# Patient Record
(demographics unavailable — no encounter records)

---

## 2024-09-22 NOTE — HMCIMG
US OB <14 WEEKS



HISTORY: Abdominal pain



COMPARISON: None



TECHNIQUE: Obstetrical ultrasound study was performed.



FINDINGS: The uterus measures 15.8 x 7 x 10.6 centimeter. Right ovary

measures 2.1 x 3.1 x 3.6 centimeter. Left ovary measures 3.5 x 2.4 x 1.9

centimeter. Flow is seen in both ovaries. There is single intrauterine

gestation with estimated gestational age of  8 weeks and 6 days. Fetal

heart rate is 181 beats per minute. No fluid is seen in the cul-de-sac.

There is right ovarian cyst measuring 1.7 x 1.4 x 1.6 cm.



IMPRESSION: 

1. There is single intrauterine gestation with estimated gestational age

of  8 weeks and 6 days. Fetal heart rate is 181 beats per minute.

## 2024-09-23 NOTE — ERN
ED Note


History of Present Illness


Stated Complaint:  C/O ABD PAIN WITH N X V X DIARRHEA


Chief Complaint:  Abdominal Pain


Time Seen by MD:  20:49


Time Seen by Midlevel:  20:49


Dictation:


The Patient is a 22-year-old female with a history of  who presents to 

the emergency department with complaints of nausea, nonbloody vomiting, 

nonbloody diarrhea, epigastric pain onset today around 3:00 a.m..  Patient 

reports she is eight weeks pregnant.  A2.  Patient denies any vaginal 

bleeding or discharge, lower abdominal pain.  Patient denies any fevers.


Allergies:  


Coded Allergies:  


     No Known Allergies (Unverified  Allergy, Unknown, 23)


Home Meds


Active Scripts


Prenatal 93/Iron/Folate 9/Dha (Westgel Dha Softgel) 1 Each Capsule, 1 EACH PO 

DAILY, #30 CAP 0 Refills


   Prov:MARQUIS RAUSCH MD         23


Cephalexin Monohydrate (Keflex) 500 Mg Cap, 500 MG PO QID for 7 Days, #28 CAP 0 

Refills


   Prov:MARQUIS RAUSCH MD         23





Past Medical History


Past Medical History:  No Pertinent History


Surgical History:  


Social History:  Negative, Lives with family


LMP:  2024


:  4


Para:  1


Aborts:  2





Review of System


Dictation


Constitutional: Negative for fever,chills, and weight loss


Eyes: Negative for injury, pain,redness, and discharge


ENT: Negative for injury,pain or swelling


Cardiovascular: Negative for chest pain, palpitations, and edema


Respiratory: Negative for shortness of breath, cough, and wheezing, 


Abdomen/GI: Negative for constipation.  Abdominal pain, nausea, vomiting, 

diarrhea


Back: Negative for injury and pain


: Negative for injury, bleeding and discharge


MS/Extremity: Negative for injury and deformity


Skin: Negative for rash, and discoloration


Neuro: Negative for headache, weakness, numbness, tingling, and seizure 


Psych: Negative for suicide ideation, homicidal ideation, and hallucinations





Initial Vital Sign


VS





                                   Vital Signs








  Date Time  Temp Pulse Resp B/P (MAP) Pulse Ox O2 Delivery O2 Flow Rate FiO2


 


24 20:49 99.3 109 20 127/71 98 Room Air  


 


24 21:17       0 21











Physical Exam


Dictation


Vital Signs reviewed 


General Appearance: Alert, oriented x 3, no acute distress, well developed, 

nourished. 


Head and Face: non-traumatic.


Eyes: PERRL, pink conjunctivas, eyelid no trauma, anterior chamber with arcus 

senilis. 


Ears: Pinnas intact and no signs of trauma or erythema ear canals clear and no 

discharge TM no erythema 


Nose: No discharge, no bleeding. 


Oropharynx: Mouth normal, tongue pink.


pharynx clear,no erythema, tonsils no exudates, no abscesses noted,  mucous 

membrane moist 


Neck: Supple, non-tender, no thyromegaly, no masses, no JVD, no bruits 


Breast:Deferred 


Chest:No tenderness, no crepitus, no paradoxical movement, no retractions 


Lungs:Clear, well-ventilated, symmetric, no rales, no wheezing, no rhonchi, no 

stridor, good breath sounds bilaterally 


Heart: Regular rate, regular rhythm, no murmur, no gallops 


Vascular: no peripheral edema, 


Abdomen: Soft, positive bowel sounds, nondistended, no guarding, 


nontender, no rebound, no masses no hepatomegaly, no splenomegaly, no Charlton's 

sign, no hernias.


Rectal: Deferred


Genital: Deferred


Neurological: Normal speech,  motor function intact, sensory function intact 


Musculoskeletal: Neck nontender, full range of motion, back nontender, full 

range of motion,


Extremities: nontender, full range of motion 


Skin: Color pink, dry, no turgor, no rash, no lacerations, no abrasions, no 

contusions.


Lymphatic: Deferred





Results (Laboratory/Radiology)


Laboratory/Radiology





Laboratory Tests








Test


 24


21:03 24


21:14 24


22:03


 


White Blood Count


 9.7 K/uL


(4.8-10.8) 


 





 


Red Blood Count


 5.00 MIL/uL


(4.00-5.50) 


 





 


Hemoglobin


 15.5 g/dL


(12.0-16.0) 


 





 


Hematocrit


 44.8 % (36-48)


 


 





 


Mean Corpuscular Volume


 89.6 fL


(79-99) 


 





 


Mean Corpuscular Hemoglobin


 31.0 pg


(27.0-33.0) 


 





 


Mean Corpuscular Hemoglobin


Concent 34.6 g/dL


(32.0-36.0) 


 





 


Red Cell Distribution Width


 12.0 %


(11.0-15.5) 


 





 


Platelet Count


 254 K/uL


(130-400) 


 





 


Mean Platelet Volume


 10.4 fL


(7.5-10.5) 


 





 


Immature Granulocyte % (Auto) 0.3 % (0-1)    


 


Neutrophils (%) (Auto)


 56.3 %


(40.0-77.0) 


 





 


Lymphocytes (%) (Auto)


 32.0 %


(21.0-51.0) 


 





 


Monocytes (%) (Auto)


 7.3 %


(3.0-13.0) 


 





 


Eosinophils (%) (Auto)


 3.5 %


(0.0-8.0) 


 





 


Basophils (%) (Auto)


 0.6 %


(0.0-5.0) 


 





 


Neutrophils # (Auto)


 5.4 K/uL


(1.8-7.7) 


 





 


Lymphocytes # (Auto)


 3.1 K/uL


(1.0-4.8) 


 





 


Monocytes # (Auto)


 0.7 K/uL


(0.1-1.0) 


 





 


Eosinophils # (Auto)


 0.34 K/uL


(0.00-0.70) 


 





 


Basophils # (Auto)


 0.06 K/uL


(0.00-0.20) 


 





 


Absolute Immature Granulocyte


(auto 0.03 K/uL


(0-1) 


 





 


Nucleated Red Blood Cells


 0.0 %


(0.0-0.19) 


 





 


Urine Color


 


 YELLOW


(YELLOW) 





 


Urine Appearance


 


 CLOUDY (CLEAR)


H 





 


Urine pH  7.0 (5.0-8.0)   


 


Urine Specific Gravity


 


 1.021


(1.001-1.031) 





 


Urine Protein


 


 20 mg/dL


(NEGATIVE)  H 





 


Urine Glucose (UA)


 


 NEGATIVE mg/dL


(NEGATIVE) 





 


Urine Ketones


 


 40 mg/dL


(NEGATIVE)  H 





 


Urine Occult Blood


 


 NEGATIVE


(NEGATIVE) 





 


Urine Nitrate


 


 NEGATIVE


(NEGATIVE) 





 


Urine Bilirubin


 


 NEGATIVE mg/dL


(NEGATIVE) 





 


Urine Urobilinogen


 


 0.2 mg/dL


(0.2-1.0) 





 


Urine Leukocyte Esterase


 


 250 Crystal/uL


(NEGATIVE)  H 





 


Urine RBC


 


 2-5 /HPF (0-1)


H 





 


Urine WBC


 


 11-25 /HPF


(0-1)  H 





 


Urine Squamous Epithelial


Cells 


 FEW /HPF (0-2)


 





 


Urine Bacteria


 


 RARE /HPF


(None Seen) 





 


Sodium Level


 


 


 130 mmol/L


(136-145)  L


 


Potassium Level


 


 


 3.1 mmol/L


(3.5-5.1)  L


 


Chloride Level


 


 


 98 mmol/L


(101-111)  L


 


Carbon Dioxide Level


 


 


 25 mmol/L


(21-32)


 


Blood Urea Nitrogen


 


 


 5 mg/dL (7-18)


L


 


Creatinine


 


 


 0.6 mg/dL


(0.5-1.0)


 


Glomerular Filtration Rate


Calc 


 


 130 mL/min


(>90)


 


Random Glucose


 


 


 130 mg/dL


()  H


 


Total Calcium


 


 


 9.0 mg/dL


(8.5-10.1)


 


Total Bilirubin


 


 


 0.4 mg/dL


(0.2-1.0)


 


Aspartate Amino Transf


(AST/SGOT) 


 


 12 U/L (10-37)





 


Alanine Aminotransferase


(ALT/SGPT) 


 


 23 U/L (12-78)





 


Alkaline Phosphatase


 


 


 56 U/L


()


 


Total Protein


 


 


 7.2 g/dL


(6.0-8.3)


 


Albumin


 


 


 2.9 g/dL


(3.5-5.0)  L


 


Lipase


 


 


 20 U/L (16-77)





 


Human Chorionic Gonadotropin,


Quant 


 


 62346 mIU/mL


(0-5)  H





REASON: 8wkg pregnant abd pain, states she had a us done 2 weeks ago during 

pregnan


ORDERING PHYSICIAN: YOEL SOUZA


PROCEDURE: OB <14  -  US OB <14 WEEKS





US OB <14 WEEKS





HISTORY: Abdominal pain





COMPARISON: None





TECHNIQUE: Obstetrical ultrasound study was performed.





FINDINGS: The uterus measures 15.8 x 7 x 10.6 centimeter. Right ovary


measures 2.1 x 3.1 x 3.6 centimeter. Left ovary measures 3.5 x 2.4 x 1.9


centimeter. Flow is seen in both ovaries. There is single intrauterine


gestation with estimated gestational age of  8 weeks and 6 days. Fetal


heart rate is 181 beats per minute. No fluid is seen in the cul-de-sac.


There is right ovarian cyst measuring 1.7 x 1.4 x 1.6 cm.





IMPRESSION: 


1. There is single intrauterine gestation with estimated gestational age


of  8 weeks and 6 days. Fetal heart rate is 181 beats per minute.


Labs Reviewed?:  Yes





ED Course


ED Course





Orders








Procedure Category Date Status





  Time 


 


Cbc With Differential LAB 24 Complete





  21:02 


 


Urinalysis Profile LAB 24 Complete





  21:02 


 


0.9%Nacl 1000ml (Ns PHA 24 Complete





1000ml)  21:30 


 


Morphine 2mg Syg PHA 24 Complete





 (Morphine 2mg Syg)  21:30 


 


Ondansetron 4mg Inj PHA 24 Complete





 (Zofran 4mg Inj)  21:30 


 


Culture Urine MARLEE 24 In Process





  21:31 


 


Us Ob <14 Weeks US 24 Resulted





  21:44 


 


Comprehensive LAB 24 Complete





Metabolic Panel  21:56 


 


Hcg,Quantitative LAB 24 Complete





  21:56 


 


Lipase LAB 24 Complete





  21:56 


 


Ceftriaxone 1g Vial PHA 24 Complete





 (Rocephine 1g Inj)  23:00 


 


Potassium Bicarb/Cit PHA 24 Complete





Ac 25meq (K-Lyte Ta  23:00 


 


Acetaminophen 500mg PHA 24 Complete





Tab (Tylenol 500mg T  23:00 








Current Medications








 Medications


  (Trade)  Dose


 Ordered  Sig/Kushal


 Route


 PRN Reason  Start Time


 Stop Time Status Last Admin


Dose Admin


 


 Acetaminophen


  (TYLenol 500MG


 TAB)  1,000 mg  ONCE  ONCE


 PO


   24 23:00


 24 23:01 DC 24 23:25





 


 Ceftriaxone Sodium


  (ROCEphine 1G


 INJ)  1 gm  ONCE  ONCE


 IVPB


   24 23:00


 24 23:01 DC 24 23:25





 


 Morphine Sulfate


  (morPHINE 2MG


 SYG)  2 mg  ONCE  ONCE


 IVP


   24 21:30


 24 21:31 DC  





 


 Ondansetron HCl


  (zoFRAN 4MG INJ)  4 mg  ONCE  ONCE


 IVP


   24 21:30


 24 21:31 DC 24 21:57





 


 Potassium


 Bicarbonate


  (K-Lyte Tablet


 Eff 25 Meq


 Tablet.eff)  25 meq  ONCE  ONCE


 PO


   24 23:00


 24 23:01 DC 24 23:24





 


 Sodium Chloride  1,000 ml @ 


 0 mls/hr  ONCE  ONCE


 IV


   24 21:30


 24 21:31 DC 24 21:57











Vital Signs








  Date Time  Temp Pulse Resp B/P (MAP) Pulse Ox O2 Delivery O2 Flow Rate FiO2


 


24 21:17 98.6 66 20 128/88 100 Room Air* 0 21


 


24 20:49 99.3 109 20 127/71 98 Room Air  











Medical Decision Making


MDM


The Patient is a 22-year-old female with a history of  who presents to 

the emergency department with complaints of nausea, nonbloody vomiting, 

nonbloody diarrhea, epigastric pain onset today around 3:00 a.m..  Patient 

reports she is eight weeks pregnant.  A2.  Patient denies any vaginal 

bleeding or discharge, lower abdominal pain.  Patient denies any fevers.


Differential diagnosis:  Gastroenteritis, gastritis, hyperemesis , 

cholecystitis,


CBC showed no leukocytosis, no anemia, chemistry showed mild hyponatremia, 

hypokalemia, hypochloremia.  Patient received 1 L of NS in ER in potassium 

replacement.  Normal renal function.  Patient also positive for UTI patient was 

given Rocephin and will be discharged with Keflex.  At this time patient is 

reassessed and reports improvement in pain but still reports some discomfort.  

Patient refused other medications because she is concerned due to her pregnancy.

 Patient with no more diarrhea episodes.  Discussed labs and imaging with 

patient and patient agrees to follow up with OBGYN tomorrow.  Patient appears 

nontoxic, stable continues with a nontender abdomen to palpation


Need for hospitalization: Patient does not meet criteria for hospitalization.





There are no social concerns with this patient.





DX & DISP


Disposition:  Discharge


Decision to Admit Time:  23:59


Departure


Impression:  


   Primary Impression:  Gastroenteritis


   Additional Impressions:  UTI (urinary tract infection), Hypokalemia, 

   Hyponatremia, Hypochloremia, Nausea and vomiting, Diarrhea


Condition:  Stable


Scripts


Cephalexin Monohydrate (Keflex) 500 Mg Cap


500 MG PO QID for 7 Days, #28 CAP


   Prov: YOEL SOUZA FN         24





Additional Instructions:  


Please follow up with OBGYN and PCP as soon as possible.  Please continue with 

oral hydration at home.  If symptoms worsen please return to ER.


FOLLOW-UP WITH PRIMARY CARE PROVIDER IN 1 TO 2 DAYS.  TAKE MEDICATIONS AS 

DIRECTED HERE IN THE EMERGENCY ROOM.  OKAY TO CONTINUE HOME MEDICATIONS UNLESS 

OTHERWISE DISCUSSED DURING YOUR VISIT IN THE EMERGENCY ROOM TODAY.  RETURN TO 

YOUR NEAREST EMERGENCY ROOM IF SYMPTOMS WORSEN OR IF THERE IS NO IMPROVEMENT.  

CALL 911 IF YOU NEED IMMEDIATE ASSISTANCE.  TAKE TYLENOL OVER-THE-COUNTER AS 

NEEDED AND IF NO CONTRAINDICATIONS ARE PRESENT.  INCREASE ORAL HYDRATION.  A 

WOUND CULTURE OR URINE CULTURE WAS ORDERED HERE IN THE EMERGENCY ROOM DEPARTMENT

PLEASE FOLLOW-UP WITH PRIMARY CARE PROVIDER AND ADVISE THEM TO GET REPEAT PORTS 

FROM OUR FACILITY.  IF YOU HAD ANY ACE WRAP/SPLINTS THAT WERE APPLIED HERE, 

PLEASE DO NOT REMOVE THEM UNTIL YOU SEE YOUR PRIMARY CARE OR SPECIALTY.


Referrals:  


SELF,REFERRAL (PCP)


Time of Disposition:  23:59


I have reviewed the case, and I agree with, Diagnosis and Plan











YOEL SOUZA               Sep 23, 2024 00:04